# Patient Record
Sex: FEMALE
[De-identification: names, ages, dates, MRNs, and addresses within clinical notes are randomized per-mention and may not be internally consistent; named-entity substitution may affect disease eponyms.]

---

## 2022-10-17 PROBLEM — Z00.00 ENCOUNTER FOR PREVENTIVE HEALTH EXAMINATION: Status: ACTIVE | Noted: 2022-10-17

## 2022-10-20 ENCOUNTER — APPOINTMENT (OUTPATIENT)
Dept: ORTHOPEDIC SURGERY | Facility: CLINIC | Age: 27
End: 2022-10-20

## 2022-10-20 ENCOUNTER — NON-APPOINTMENT (OUTPATIENT)
Age: 27
End: 2022-10-20

## 2022-10-20 VITALS
DIASTOLIC BLOOD PRESSURE: 77 MMHG | BODY MASS INDEX: 23.18 KG/M2 | SYSTOLIC BLOOD PRESSURE: 113 MMHG | WEIGHT: 115 LBS | HEIGHT: 59 IN | HEART RATE: 65 BPM

## 2022-10-20 DIAGNOSIS — M25.561 PAIN IN RIGHT KNEE: ICD-10-CM

## 2022-10-20 PROCEDURE — 99204 OFFICE O/P NEW MOD 45 MIN: CPT

## 2022-10-20 PROCEDURE — 73564 X-RAY EXAM KNEE 4 OR MORE: CPT | Mod: RT

## 2022-10-20 RX ORDER — MELOXICAM 15 MG/1
15 TABLET ORAL DAILY
Qty: 30 | Refills: 0 | Status: ACTIVE | COMMUNITY
Start: 2022-10-20 | End: 1900-01-01

## 2022-10-24 NOTE — HISTORY OF PRESENT ILLNESS
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  Has been ambulating/limping with pain, feels 8/10 pain.  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

## 2022-10-24 NOTE — PHYSICAL EXAM
[de-identified] : Gen: NAD\par Resp: Nonlabored respirations, no SOB\par Gait: antalgic\par \par R Knee:\par Skin intact\par Trace effusion\par 0-110 AROM (130 L)\par Nontender MJL/LJL\par 5/5 IP Q EHL TA GS\par SILT L3-S1\par 2+ dp bcr\par \par 1A lachman and (-) pivot shift\par Grade 1 posterior drawer\par Stable to v/v at 0 and 30 degrees but pain with valgus\par \par (-) Radha, Thessaly\par \par 1+ patellar translation\par (-) J sign\par (-) apprehension  [de-identified] : xr r knee 4 view - no frx/disocation, no oa.  Peng avulsion near medial epicondyle visible on oblique xr

## 2022-10-24 NOTE — HISTORY OF PRESENT ILLNESS
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  Has been ambulating/limping with pain, feels 8/10 pain.  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

## 2022-10-24 NOTE — DISCUSSION/SUMMARY
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  The natural history of the disease was thoroughly explained.\par The patient will proceed with:\par -PT\par -binh for 2 more weeks for stability, cane\par -meloxicam rx provided\par -ice/rest\par -RTC 6w\par \par I have personally obtained the history, reviewed the ROS as noted, and performed the physical examination today.  The patient and I discussed the assessment and options and developed the plan.  All questions were answered and the patient stated their understanding of the treatment plan and appreciation of the visit.\par \par Gregory Dejesus MD

## 2022-10-24 NOTE — PHYSICAL EXAM
[de-identified] : Gen: NAD\par Resp: Nonlabored respirations, no SOB\par Gait: antalgic\par \par R Knee:\par Skin intact\par Trace effusion\par 0-110 AROM (130 L)\par Nontender MJL/LJL\par 5/5 IP Q EHL TA GS\par SILT L3-S1\par 2+ dp bcr\par \par 1A lachman and (-) pivot shift\par Grade 1 posterior drawer\par Stable to v/v at 0 and 30 degrees but pain with valgus\par \par (-) Radha, Thessaly\par \par 1+ patellar translation\par (-) J sign\par (-) apprehension  [de-identified] : xr r knee 4 view - no frx/disocation, no oa.  Peng avulsion near medial epicondyle visible on oblique xr

## 2022-10-24 NOTE — DISCUSSION/SUMMARY
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  The natural history of the disease was thoroughly explained.\par The patient will proceed with:\par -PT\par -binh for 2 more weeks for stability, cane\par -meloxicam rx provided\par -ice/rest\par -RTC 6w\par \par I have personally obtained the history, reviewed the ROS as noted, and performed the physical examination today.  The patient and I discussed the assessment and options and developed the plan.  All questions were answered and the patient stated their understanding of the treatment plan and appreciation of the visit.\par \par Gregory Dejesus MD

## 2022-11-30 ENCOUNTER — APPOINTMENT (OUTPATIENT)
Dept: ORTHOPEDIC SURGERY | Facility: CLINIC | Age: 27
End: 2022-11-30

## 2022-12-27 ENCOUNTER — APPOINTMENT (OUTPATIENT)
Dept: ORTHOPEDIC SURGERY | Facility: CLINIC | Age: 27
End: 2022-12-27

## 2022-12-29 ENCOUNTER — APPOINTMENT (OUTPATIENT)
Dept: ORTHOPEDIC SURGERY | Facility: CLINIC | Age: 27
End: 2022-12-29

## 2022-12-29 VITALS — DIASTOLIC BLOOD PRESSURE: 82 MMHG | SYSTOLIC BLOOD PRESSURE: 118 MMHG | HEART RATE: 80 BPM

## 2022-12-29 PROCEDURE — 99213 OFFICE O/P EST LOW 20 MIN: CPT

## 2022-12-29 RX ORDER — LIDOCAINE 5% 700 MG/1
5 PATCH TOPICAL
Qty: 20 | Refills: 0 | Status: ACTIVE | COMMUNITY
Start: 2022-12-29 | End: 1900-01-01

## 2022-12-29 NOTE — DISCUSSION/SUMMARY
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  Has completed 2 months of conservative tx.\par The patient was extensively counseled on treatment options including but not limited to observation, rest/activity modification, bracing, anti-inflammatory medications, physical therapy, injections, and surgery.  The natural history of the disease was thoroughly explained.\par The patient will proceed with:\par -MRI R knee\par -continue HEP, has completed PT\par -lidoderm rx\par -ice/rest\par -RTC after MRI\par \par I have personally obtained the history, reviewed the ROS as noted, and performed the physical examination today.  The patient and I discussed the assessment and options and developed the plan.  All questions were answered and the patient stated their understanding of the treatment plan and appreciation of the visit.\par \par Gregory Dejesus MD

## 2022-12-29 NOTE — HISTORY OF PRESENT ILLNESS
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  Has been ambulating/limping with pain, feels 8/10 pain.  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  Stairs still exacerbate pain, inquires about MRI to evaluate knee further, no numbness/paresthesias

## 2022-12-29 NOTE — PHYSICAL EXAM
[de-identified] : Gen: NAD\par Resp: Nonlabored respirations, no SOB\par Gait: slightly antalgic, no assistive device\par \par R Knee:\par Skin intact\par Trace effusion\par 0-120 AROM (130 L)\par Nontender MJL/LJL\par 5/5 IP Q EHL TA GS\par SILT L3-S1\par 2+ dp bcr\par \par 1A lachman and (-) pivot shift\par Grade 1 posterior drawer\par Stable to v/v at 0 and 30 degrees but pain with valgus\par \par (+) Radha, Thessaly\par \par 1+ patellar translation\par (-) J sign\par (-) apprehension  [de-identified] : xr r knee 4 view - no frx/disocation, no oa.  Peng avulsion near medial epicondyle visible on oblique xr

## 2023-01-05 ENCOUNTER — APPOINTMENT (OUTPATIENT)
Dept: MRI IMAGING | Facility: HOSPITAL | Age: 28
End: 2023-01-05

## 2023-01-11 ENCOUNTER — APPOINTMENT (OUTPATIENT)
Dept: MRI IMAGING | Facility: CLINIC | Age: 28
End: 2023-01-11
Payer: COMMERCIAL

## 2023-01-11 PROCEDURE — 73721 MRI JNT OF LWR EXTRE W/O DYE: CPT | Mod: RT

## 2023-01-26 ENCOUNTER — APPOINTMENT (OUTPATIENT)
Dept: ORTHOPEDIC SURGERY | Facility: CLINIC | Age: 28
End: 2023-01-26
Payer: COMMERCIAL

## 2023-01-26 PROCEDURE — 99213 OFFICE O/P EST LOW 20 MIN: CPT | Mod: 95

## 2023-01-26 NOTE — HISTORY OF PRESENT ILLNESS
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  Has been ambulating/limping with pain, feels 8/10 pain.  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  Stairs still exacerbate pain, inquires about MRI to evaluate knee further, no numbness/paresthesias\par \par 1/26 interval - She has completed her MRI and now only feels knee pain when she bends her knee deeply.  Her pain has improved significantly but she is concerned that despite being almost 4 months from the injury, her knee does not feel normal

## 2023-01-26 NOTE — DISCUSSION/SUMMARY
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  Has completed 3.5 months of conservative treatment.  Her MRI demonstrated no meniscus or acl/pcl injury with a small focal lateral MFC chondral fissure with concomitant edema.  I discussed that I would not pursue this finding surgically until she has trialed at least another 2 months of conservative tx.  If she continues to feel pain and notice effusions with deep knee flexion after more than 4-5 months have elapsed from her initial injury with an intensive nonoperative treatment course, we may discuss a single vs staged treatment of a diagnostic arthroscopy to assess her intraarticular chondral lesion.\par The patient was extensively counseled on treatment options including but not limited to observation, rest/activity modification, bracing, anti-inflammatory medications, physical therapy, injections, and surgery.  The natural history of the disease was thoroughly explained.\par The patient will proceed with:\par -continue HEP, has completed 2+ months of PT\par -nsaid prn\par -ice/rest/elevate when there is swelling\par -rtc 6-8w\par \par I have personally obtained the history, reviewed the ROS as noted, and performed the physical examination today.  The patient and I discussed the assessment and options and developed the plan.  All questions were answered and the patient stated their understanding of the treatment plan and appreciation of the visit.\par \par Gregory Dejesus MD

## 2023-01-26 NOTE — REASON FOR VISIT
[Home] : at home, [unfilled] , at the time of the visit. [Medical Office: ([facility])___] : at the medical office located in  [Patient] : the patient [Self] : self [Initial Visit] : an initial visit for [Knee Sprain] : knee sprain

## 2023-01-26 NOTE — PHYSICAL EXAM
[de-identified] : Gen: NAD\par Resp: Nonlabored respirations, no SOB\par Gait: slightly antalgic, no assistive device\par \par R Knee:\par Skin intact\par Trace effusion\par 0-120 AROM (130 L)\par Nontender MJL/LJL\par 5/5 IP Q EHL TA GS\par SILT L3-S1\par 2+ dp bcr\par \par 1A lachman and (-) pivot shift\par Grade 1 posterior drawer\par Stable to v/v at 0 and 30 degrees but pain with valgus\par \par (+) Radha, Thessaly\par \par 1+ patellar translation\par (-) J sign\par (-) apprehension  [de-identified] : xr r knee 4 view - no frx/disocation, no oa.  Peng avulsion near medial epicondyle visible on oblique xr\par \par MRI R knee - thickening/healed MCL injury, no meniscus or acl/pcl/lcl injury, sparse lateral MFC edema with small chondral fissuring

## 2023-04-06 ENCOUNTER — APPOINTMENT (OUTPATIENT)
Dept: ORTHOPEDIC SURGERY | Facility: CLINIC | Age: 28
End: 2023-04-06
Payer: COMMERCIAL

## 2023-04-06 VITALS — DIASTOLIC BLOOD PRESSURE: 85 MMHG | HEART RATE: 71 BPM | SYSTOLIC BLOOD PRESSURE: 119 MMHG

## 2023-04-06 DIAGNOSIS — E65 LOCALIZED ADIPOSITY: ICD-10-CM

## 2023-04-06 DIAGNOSIS — S83.411A SPRAIN OF MEDIAL COLLATERAL LIGAMENT OF RIGHT KNEE, INITIAL ENCOUNTER: ICD-10-CM

## 2023-04-06 PROCEDURE — 99214 OFFICE O/P EST MOD 30 MIN: CPT

## 2023-04-06 NOTE — DISCUSSION/SUMMARY
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  Her MRI demonstrated no meniscus or acl/pcl injury with a small focal lateral MFC chondral fissure with concomitant edema.  We discussed that continued pain may lead to the recommendation of a diagnostic arthroscopy to assess her chondral lesion and treat her fat pad impingement but I would not recommend that at this time.\par The patient was extensively counseled on treatment options including but not limited to observation, rest/activity modification, bracing, anti-inflammatory medications, physical therapy, injections, and surgery.  The natural history of the disease was thoroughly explained.\par The patient will proceed with:\par -PT rx\par -nsaid prn\par -ice/rest/elevate when there is swelling\par -rtc 6-8w\par \par I have personally obtained the history, reviewed the ROS as noted, and performed the physical examination today.  The patient and I discussed the assessment and options and developed the plan.  All questions were answered and the patient stated their understanding of the treatment plan and appreciation of the visit.\par \par Gregory Dejesus MD

## 2023-04-06 NOTE — HISTORY OF PRESENT ILLNESS
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  Has been ambulating/limping with pain, feels 8/10 pain.  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  Stairs still exacerbate pain, inquires about MRI to evaluate knee further, no numbness/paresthesias\par \par 1/26 interval - She has completed her MRI and now only feels knee pain when she bends her knee deeply.  Her pain has improved significantly but she is concerned that despite being almost 4 months from the injury, her knee does not feel normal\par \par 4/6 interval - her medial knee pain has subsided but she now has sharp lateral patellar pain that worsens with stairs and sometimes at night.  She has not done PT in 3-4 months and does not find any topical treatment helpful

## 2023-04-06 NOTE — PHYSICAL EXAM
[de-identified] : Gen: NAD\par Resp: Nonlabored respirations, no SOB\par Gait: slightly antalgic, no assistive device\par \par R Knee:\par Skin intact\par No effusion\par 0-130 AROM (130 L)\par Minimally tender lateral patella\par 5/5 IP Q EHL TA GS\par SILT L3-S1\par 2+ dp bcr\par \par 1A lachman and (-) pivot shift\par Grade 1 posterior drawer\par Stable to v/v at 0 and 30 degrees but pain with valgus\par \par (-) Radha, Thessaly\par \par 1+ patellar translation\par (-) J sign\par (-) apprehension  [de-identified] : xr r knee 4 view - no frx/disocation, no oa.  Peng avulsion near medial epicondyle visible on oblique xr\par \par MRI R knee - thickening/healed MCL injury, no meniscus or acl/pcl/lcl injury, sparse lateral MFC edema with small chondral fissuring, lateral fat pad inflammation

## 2023-04-06 NOTE — REASON FOR VISIT
[Initial Visit] : an initial visit for [Knee Sprain] : knee sprain [Home] : at home, [unfilled] , at the time of the visit. [Medical Office: ([facility])___] : at the medical office located in  [Patient] : the patient [Self] : self

## 2025-07-15 ENCOUNTER — APPOINTMENT (OUTPATIENT)
Dept: DERMATOLOGY | Facility: CLINIC | Age: 30
End: 2025-07-15